# Patient Record
Sex: FEMALE | Race: WHITE | URBAN - METROPOLITAN AREA
[De-identification: names, ages, dates, MRNs, and addresses within clinical notes are randomized per-mention and may not be internally consistent; named-entity substitution may affect disease eponyms.]

---

## 2020-07-27 ENCOUNTER — VIRTUAL VISIT (OUTPATIENT)
Dept: FAMILY MEDICINE CLINIC | Age: 80
End: 2020-07-27

## 2020-07-27 DIAGNOSIS — E06.3 HYPOTHYROIDISM DUE TO HASHIMOTO'S THYROIDITIS: ICD-10-CM

## 2020-07-27 DIAGNOSIS — E03.8 HYPOTHYROIDISM DUE TO HASHIMOTO'S THYROIDITIS: ICD-10-CM

## 2020-07-27 DIAGNOSIS — M81.0 AGE-RELATED OSTEOPOROSIS WITHOUT CURRENT PATHOLOGICAL FRACTURE: ICD-10-CM

## 2020-07-27 DIAGNOSIS — K21.9 GASTROESOPHAGEAL REFLUX DISEASE WITHOUT ESOPHAGITIS: Primary | ICD-10-CM

## 2020-07-27 DIAGNOSIS — F41.8 DEPRESSION WITH ANXIETY: ICD-10-CM

## 2020-07-27 DIAGNOSIS — G20 PARKINSON'S DISEASE (HCC): ICD-10-CM

## 2020-07-27 DIAGNOSIS — E78.00 PURE HYPERCHOLESTEROLEMIA: ICD-10-CM

## 2020-07-27 PROBLEM — F13.20 BENZODIAZEPINE DEPENDENCE (HCC): Status: ACTIVE | Noted: 2020-07-27

## 2020-07-27 PROBLEM — E03.9 ACQUIRED HYPOTHYROIDISM: Status: RESOLVED | Noted: 2020-07-27 | Resolved: 2020-07-27

## 2020-07-27 PROBLEM — E03.9 ACQUIRED HYPOTHYROIDISM: Status: ACTIVE | Noted: 2020-07-27

## 2020-07-27 PROBLEM — K22.4 ESOPHAGEAL DYSMOTILITY: Status: ACTIVE | Noted: 2020-07-27

## 2020-07-27 RX ORDER — BUPROPION HYDROCHLORIDE 100 MG/1
TABLET, EXTENDED RELEASE ORAL
COMMUNITY
Start: 2020-05-12 | End: 2020-07-27 | Stop reason: ALTCHOICE

## 2020-07-27 RX ORDER — LOVASTATIN 40 MG/1
40 TABLET ORAL
Qty: 90 TAB | Refills: 2 | COMMUNITY
Start: 2020-07-27

## 2020-07-27 RX ORDER — ESCITALOPRAM OXALATE 20 MG/1
20 TABLET ORAL DAILY
COMMUNITY
Start: 2020-03-11 | End: 2020-08-28 | Stop reason: ALTCHOICE

## 2020-07-27 RX ORDER — LEVOTHYROXINE, LIOTHYRONINE 9.5; 2.25 UG/1; UG/1
TABLET ORAL
COMMUNITY
Start: 2020-05-14 | End: 2020-07-27 | Stop reason: SINTOL

## 2020-07-27 RX ORDER — LORAZEPAM 0.5 MG/1
0.5 TABLET ORAL
COMMUNITY
End: 2020-08-28

## 2020-07-27 RX ORDER — AMLODIPINE BESYLATE 5 MG/1
5 TABLET ORAL DAILY
COMMUNITY
Start: 2020-04-13 | End: 2020-12-28 | Stop reason: SDUPTHER

## 2020-07-27 RX ORDER — LISINOPRIL 40 MG/1
40 TABLET ORAL DAILY
COMMUNITY
Start: 2020-05-11

## 2020-07-27 RX ORDER — CARBIDOPA AND LEVODOPA 25; 100 MG/1; MG/1
0.5 TABLET ORAL 3 TIMES DAILY
COMMUNITY
Start: 2020-04-14

## 2020-07-27 RX ORDER — OMEPRAZOLE 40 MG/1
40 CAPSULE, DELAYED RELEASE ORAL DAILY
Qty: 14 CAP | Refills: 0 | Status: SHIPPED | OUTPATIENT
Start: 2020-07-27 | End: 2020-08-28 | Stop reason: SDUPTHER

## 2020-07-27 RX ORDER — LEVOTHYROXINE SODIUM 25 UG/1
1 CAPSULE ORAL
Qty: 90 CAP | Refills: 0 | Status: SHIPPED | OUTPATIENT
Start: 2020-07-27 | End: 2020-09-22 | Stop reason: ALTCHOICE

## 2020-07-27 NOTE — PROGRESS NOTES
Assessment/Plan:    Diagnoses and all orders for this visit:    1. Gastroesophageal reflux disease without esophagitis- 2 week trial   -     omeprazole (PRILOSEC) 40 mg capsule; Take 1 Cap by mouth daily. 2. Age-related osteoporosis without current pathological fracture- declines treatment. 3. Hypothyroidism due to Hashimoto's thyroiditis- failed NP thyroid and synthroid. Trial tirosint after 1-2 weeks of stopping NP thyroid to let side effects subside.  -     Tirosint 25 mcg cap; Take 1 Cap by mouth nightly.  -     TSH 3RD GENERATION; Future  -     T4, FREE; Future    4. Parkinson's disease ([facility] [address]- on sinement. 5. Depression with anxiety- manged by psych. Would like to see her wean ativan. 6. Pure hypercholesterolemia  -     LIPID PANEL W/ REFLX DIRECT LDL; Future    A total of 25 minutes was spent with the patient of which 25 minutes was spent in counseling regarding numerous health conditions, symptoms. Pascual Romero is a 78 y.o. female evaluated via telephone on 7/27/2020. Consent:  She and/or health care decision maker is aware that that she may receive a bill for this telephone service, depending on her insurance coverage, and has provided verbal consent to proceed: Yes      I affirm this is a Patient Initiated Episode with an Established Patient who has not had a related appointment within my department in the past 7 days or scheduled within the next 24 hours. Total Time: minutes: 21-30 minutes    Note: not billable if this call serves to triage the patient into an appointment for the relevant concern  This service was provided through (Telehealth, Virtual Check In or E-Visit), both the patient at home and the provider at [facility]  and the [facility] at [address] [place] [address] was discussed with the patient. The patient verbalized understanding and is in agreement with the plan. All medication potential side effects were discussed with the patient.   Health Maintenance:   Health Maintenance   Topic Date Due    Lipid Screen  12/21/1950    Pneumococcal 65+ years (2 of 2 - PPSV23) 05/28/2016    Medicare Yearly Exam  07/24/2020    DTaP/Tdap/Td series (1 - Tdap) 07/27/2021 (Originally 12/21/1961)    Shingrix Vaccine Age 50> (1 of 2) 08/11/2021 (Originally 12/21/1990)    Influenza Age 5 to Adult  08/01/2020    GLAUCOMA SCREENING Q2Y  07/01/2022    Bone Densitometry (Dexa) Screening  Completed       Wang Graf is a 78 y.o.  female and presents with No chief complaint on file. Subjective:  Pt is here to establish care. Hypothyroid - managed by dr. Trevon Lynn. Dx 1.5 yrs ago. Intol levothyroxine/synthroid due to palpitations. Has tried various formulations (NP thyroid) with a variety of side effects. She has lack of appetite with unintentional wt loss of 18lb x 1.5 yrs, since starting meds. PD - managed by Dr. Flower Avila, dx just 4mos ago. That's controlling her tremor. depr and anxiety with chronic benzo use - manged by psych. Osteoporosis-  Not on meds. Previously treated with bisphosphonate. Declines treatment. HTN - I reviewed labs in [facility]. GFR good. MRI recently looked pretty good. Minimal small vessel dz. She states she has \"stomach aches a lot\". She feels this is from the thyroid meds. She describes a burning sensation in her stomach. Doesn't feel eating is related to her stomach aches. ROS:  Constitutional: + recent weight change. No weakness/fatigue. No f/c. Skin: No rashes, change in nails/hair, itching   HENT: No HA, dizziness. No hearing loss/tinnitus. No nasal congestion/discharge. Eyes: No change in vision, double/blurred vision or eye pain/redness. Cardiovascular: No CP/palpitations. No HORTA/orthopnea/PND. Respiratory: No cough/sputum, dyspnea, wheezing. Gastointestinal: No dysphagia, +reflux. No n/v. No constipation/diarrhea. No melena/rectal bleeding.    Genitourinary: No dysuria, urinary hesitancy, nocturia, hematuria. No incontinence. Musculoskeletal: No joint pain/stiffness. No muscle pain/tenderness. Endo: No heat/cold intolerance, no polyuria/polydypsia. Heme: No h/o anemia. No easy bleeding/bruising. Allergy/Immunology: No seasonal rhinitis. Denies frequent colds, sinus/ear infections. Neurological: No seizures/numbness/weakness. No paresthesias. Psychiatric:  + depression, +anxiety. PMH:  Past Medical History:   Diagnosis Date    Anxiety disorder     Diverticular disease of colon     Hemorrhoids, internal     Hypertension     Mixed hyperlipidemia     MVP (mitral valve prolapse)     Vision impairment        PSH:  Past Surgical History:   Procedure Laterality Date    HX CATARACT REMOVAL  2008    HX HEMORRHOIDECTOMY  25 years ago        SH:  Social History     Tobacco Use    Smoking status: Former Smoker     Packs/day: 0.50     Years: 20.00     Pack years: 10.00    Smokeless tobacco: Never Used   Substance Use Topics    Alcohol use: Yes    Drug use: No       FH:  Family History   Problem Relation Age of Onset    Diabetes Father     Heart Failure Mother        Medications/Allergies:    Current Outpatient Medications:     amLODIPine (NORVASC) 5 mg tablet, Take 5 mg by mouth daily. , Disp: , Rfl:     carbidopa-levodopa (SINEMET)  mg per tablet, Take 0.5 Tabs by mouth three (3) times daily. , Disp: , Rfl:     escitalopram oxalate (LEXAPRO) 20 mg tablet, Take 20 mg by mouth daily. , Disp: , Rfl:     lisinopriL (PRINIVIL, ZESTRIL) 40 mg tablet, Take 40 mg by mouth daily. , Disp: , Rfl:     lovastatin (MEVACOR) 40 mg tablet, Take 1 Tab by mouth nightly., Disp: 90 Tab, Rfl: 2    LORazepam (ATIVAN) 0.5 mg tablet, Take 0.5 mg by mouth every six (6) hours as needed. , Disp: , Rfl:     NP Thyroid 15 mg tablet, , Disp: , Rfl:     aspirin 81 mg tablet, Take 81 mg by mouth.  , Disp: , Rfl:     Cholecalciferol, Vitamin D3, (VITAMIN D3) 1,000 unit Cap, Take  by mouth.  , Disp: , Rfl:    MULTIVITAMIN PO, Take  by mouth.  , Disp: , Rfl:   Allergies   Allergen Reactions    Hydrochlorothiazide Vertigo     Dizziness

## 2020-08-28 ENCOUNTER — VIRTUAL VISIT (OUTPATIENT)
Dept: FAMILY MEDICINE CLINIC | Age: 80
End: 2020-08-28

## 2020-08-28 DIAGNOSIS — K21.9 GASTROESOPHAGEAL REFLUX DISEASE WITHOUT ESOPHAGITIS: ICD-10-CM

## 2020-08-28 DIAGNOSIS — F13.20 BENZODIAZEPINE DEPENDENCE (HCC): ICD-10-CM

## 2020-08-28 DIAGNOSIS — E06.3 HYPOTHYROIDISM DUE TO HASHIMOTO'S THYROIDITIS: ICD-10-CM

## 2020-08-28 DIAGNOSIS — E03.8 HYPOTHYROIDISM DUE TO HASHIMOTO'S THYROIDITIS: ICD-10-CM

## 2020-08-28 DIAGNOSIS — F41.8 DEPRESSION WITH ANXIETY: Primary | ICD-10-CM

## 2020-08-28 DIAGNOSIS — Z00.00 MEDICARE ANNUAL WELLNESS VISIT, SUBSEQUENT: ICD-10-CM

## 2020-08-28 RX ORDER — OMEPRAZOLE 40 MG/1
40 CAPSULE, DELAYED RELEASE ORAL DAILY
Qty: 90 CAP | Refills: 3 | Status: SHIPPED | OUTPATIENT
Start: 2020-08-28

## 2020-08-28 RX ORDER — LORAZEPAM 0.5 MG/1
0.25 TABLET ORAL 2 TIMES DAILY
Qty: 60 TAB | Refills: 0
Start: 2020-08-28 | End: 2020-09-22

## 2020-08-28 RX ORDER — VENLAFAXINE HYDROCHLORIDE 37.5 MG/1
37.5 CAPSULE, EXTENDED RELEASE ORAL DAILY
Qty: 90 CAP | Refills: 0 | Status: SHIPPED | OUTPATIENT
Start: 2020-08-28 | End: 2020-09-22

## 2020-08-28 NOTE — PATIENT INSTRUCTIONS
Medicare Wellness Visit, Female The best way to live healthy is to have a lifestyle where you eat a well-balanced diet, exercise regularly, limit alcohol use, and quit all forms of tobacco/nicotine, if applicable. Regular preventive services are another way to keep healthy. Preventive services (vaccines, screening tests, monitoring & exams) can help personalize your care plan, which helps you manage your own care. Screening tests can find health problems at the earliest stages, when they are easiest to treat. [facility] follows the current, evidence-based guidelines published by the [facility] ([facility]) when recommending preventive services for our patients. Because we follow these guidelines, sometimes recommendations change over time as research supports it. (For example, mammograms used to be recommended annually. Even though Medicare will still pay for an annual mammogram, the newer guidelines recommend a mammogram every two years for women of average risk). Of course, you and your doctor may decide to screen more often for some diseases, based on your risk and your co-morbidities (chronic disease you are already diagnosed with). Preventive services for you include: - Medicare offers their members a free annual wellness visit, which is time for you and your primary care provider to discuss and plan for your preventive service needs. Take advantage of this benefit every year! 
-All adults over the age of 72 should receive the recommended pneumonia vaccines. Current USPSTF guidelines recommend a series of two vaccines for the best pneumonia protection.  
-All adults should have a flu vaccine yearly and a tetanus vaccine every 10 years.  
-All adults age 48 and older should receive the shingles vaccines (series of two vaccines). -All adults age 38-68 who are overweight should have a diabetes screening test once every three years. -All adults born between [birth year] and 1965 should be screened once for Hepatitis C. 
-Other screening tests and preventive services for persons with diabetes include: an eye exam to screen for diabetic retinopathy, a kidney function test, a foot exam, and stricter control over your cholesterol.  
-Cardiovascular screening for adults with routine risk involves an electrocardiogram (ECG) at intervals determined by your doctor.  
-Colorectal cancer screenings should be done for adults age 54-65 with no increased risk factors for colorectal cancer. There are a number of acceptable methods of screening for this type of cancer. Each test has its own benefits and drawbacks. Discuss with your doctor what is most appropriate for you during your annual wellness visit. The different tests include: colonoscopy (considered the best screening method), a fecal occult blood test, a fecal DNA test, and sigmoidoscopy. 
 
-A bone mass density test is recommended when a woman turns 65 to screen for osteoporosis. This test is only recommended one time, as a screening. Some providers will use this same test as a disease monitoring tool if you already have osteoporosis. -Breast cancer screenings are recommended every other year for women of normal risk, age 54-69. 
-Cervical cancer screenings for women over age 72 are only recommended with certain risk factors. Here is a list of your current Health Maintenance items (your personalized list of preventive services) with a due date: 
Health Maintenance Due Topic Date Due  Cholesterol Test   12/21/1950  Pneumococcal Vaccine (2 of 2 - PPSV23) 05/28/2016

## 2020-08-28 NOTE — PROGRESS NOTES
Adeal Dacosta is a 78 y.o. female who was seen by synchronous (real-time) audio technology on 8/28/2020 for Hypothyroidism and Anxiety    Assessment & Plan:   Diagnoses and all orders for this visit:    1. Depression with anxiety  -     venlafaxine-SR (EFFEXOR-XR) 37.5 mg capsule; Take 1 Cap by mouth daily.  -     LORazepam (ATIVAN) 0.5 mg tablet; Take 0.5 Tabs by mouth two (2) times a day. Max Daily Amount: 0.5 mg.    2. Benzodiazepine dependence ([facility] [address]- will wean patient and take over management of meds. -     LORazepam (ATIVAN) 0.5 mg tablet; Take 0.5 Tabs by mouth two (2) times a day. Max Daily Amount: 0.5 mg.    3. Hypothyroidism due to Hashimoto's thyroiditis    4. Gastroesophageal reflux disease without esophagitis  -     omeprazole (PRILOSEC) 40 mg capsule; Take 1 Cap by mouth daily. 5. Medicare annual wellness visit, subsequent      A total of 20 minutes was spent with the patient of which 20 minutes was spent in counseling regarding the above issues. Subjective:     Hypothyroid - failed np thyroid and synthroid. Tried on tirosint. She's tolerating is well so far. However, she still has lack of appetite and fatigue. She says she sleeps a lot during the daytime. She thinks it may be related to ativan use. Depression and anxiety - not well controlled. On lexapro. She worries all the time. She used to work prior to the pandemic and her life has drastically changed. She states she doesn't have anything to keep her busy. She endorses depression. Failed paxil. Was on zoloft many years ago, but not sure why she switched.    gerd - ppi helping a lot with her abd pain. Prior to Admission medications    Medication Sig Start Date End Date Taking? Authorizing Provider   amLODIPine (NORVASC) 5 mg tablet Take 5 mg by mouth daily. 4/13/20   Provider, Historical   carbidopa-levodopa (SINEMET)  mg per tablet Take 0.5 Tabs by mouth three (3) times daily.  4/14/20   Provider, Historical escitalopram oxalate (LEXAPRO) 20 mg tablet Take 20 mg by mouth daily. 3/11/20   Provider, Historical   LORazepam (ATIVAN) 0.5 mg tablet Take 0.5 mg by mouth every six (6) hours as needed. Provider, Historical   lisinopriL (PRINIVIL, ZESTRIL) 40 mg tablet Take 40 mg by mouth daily. 5/11/20   Provider, Historical   lovastatin (MEVACOR) 40 mg tablet Take 1 Tab by mouth nightly. 7/27/20   Unruly Mishra MD   Tirosint 25 mcg cap Take 1 Cap by mouth nightly. 7/27/20   Unruly Mishra MD   omeprazole (PRILOSEC) 40 mg capsule Take 1 Cap by mouth daily. 7/27/20   Unruly Mishra MD   aspirin 81 mg tablet Take 81 mg by mouth. Provider, Historical   Cholecalciferol, Vitamin D3, (VITAMIN D3) 1,000 unit Cap Take  by mouth. Provider, Historical   MULTIVITAMIN PO Take  by mouth. Provider, Historical     Patient Active Problem List   Diagnosis Code    Unspecified vitamin D deficiency E55.9    Benign hypertension I10    Depression with anxiety F41.8    Diverticulosis of colon without hemorrhage K57.30    Acid reflux K21.9    Esophageal dysmotility K22.4    Osteoporosis M81.0    Parkinsonism ([address] G20    Benzodiazepine dependence ([facility] [address] F13.20    Hypothyroidism due to Hashimoto's thyroiditis E03.8, E06.3       Review of Systems   Constitutional: Positive for malaise/fatigue. Gastrointestinal: Positive for heartburn. Neurological: Positive for tremors. Psychiatric/Behavioral: Positive for depression and memory loss. The patient is nervous/anxious.         Other pertinent observable physical exam findings:-    3 most recent PHQ Screens 8/28/2020   PHQ Not Done Active Diagnosis of Depression or Bipolar Disorder   Little interest or pleasure in doing things Nearly every day   Feeling down, depressed, irritable, or hopeless Nearly every day   Total Score PHQ 2 6   Trouble falling or staying asleep, or sleeping too much Not at all   Feeling tired or having little energy Nearly every day   Poor appetite, weight loss, or overeating Nearly every day   Feeling bad about yourself - or that you are a failure or have let yourself or your family down Not at all   Trouble concentrating on things such as school, work, reading, or watching TV Not at all   Moving or speaking so slowly that other people could have noticed; or the opposite being so fidgety that others notice Not at all   Thoughts of being better off dead, or hurting yourself in some way Not at all   PHQ 9 Score 12   How difficult have these problems made it for you to do your work, take care of your home and get along with others Very difficult         We discussed the expected course, resolution and complications of the diagnosis(es) in detail. Medication risks, benefits, costs, interactions, and alternatives were discussed as indicated. I advised her to contact the office if her condition worsens, changes or fails to improve as anticipated. She expressed understanding with the diagnosis(es) and plan. Oscar Webb, who was evaluated through a patient-initiated, synchronous (real-time) audio-video encounter, and/or her healthcare decision maker, is aware that it is a billable service, with coverage as determined by her insurance carrier. She provided verbal consent to proceed: Yes, and patient identification was verified. It was conducted pursuant to the emergency declaration under the [address] [place] [address] authority and the [employer] and [employer] Act. A caregiver was present when appropriate. Ability to conduct physical exam was limited. I was at home. The patient was at home. Siddhartha Duarte MD      This is the Subsequent Medicare Annual Wellness Exam, performed 12 months or more after the Initial AWV or the last Subsequent AWV    I have reviewed the patient's medical history in detail and updated the computerized patient record.      History     Patient Active Problem List   Diagnosis Code    Unspecified vitamin D deficiency E55.9    Benign hypertension I10    Depression with anxiety F41.8    Diverticulosis of colon without hemorrhage K57.30    Acid reflux K21.9    Esophageal dysmotility K22.4    Osteoporosis M81.0    Parkinsonism ([facility] [address] G20    Benzodiazepine dependence ([facility] [address] F13.20    Hypothyroidism due to Hashimoto's thyroiditis E03.8, E06.3     Past Medical History:   Diagnosis Date    Anxiety disorder     Diverticular disease of colon     Hemorrhoids, internal     Hypertension     Hypothyroid     Mixed hyperlipidemia     MVP (mitral valve prolapse)     Parkinson disease ([facility] [address]     Vision impairment       Past Surgical History:   Procedure Laterality Date    HX CATARACT REMOVAL  2008    HX HEMORRHOIDECTOMY  25 years ago     Current Outpatient Medications   Medication Sig Dispense Refill    amLODIPine (NORVASC) 5 mg tablet Take 5 mg by mouth daily.  carbidopa-levodopa (SINEMET)  mg per tablet Take 0.5 Tabs by mouth three (3) times daily.  escitalopram oxalate (LEXAPRO) 20 mg tablet Take 20 mg by mouth daily.  LORazepam (ATIVAN) 0.5 mg tablet Take 0.5 mg by mouth every six (6) hours as needed.  lisinopriL (PRINIVIL, ZESTRIL) 40 mg tablet Take 40 mg by mouth daily.  lovastatin (MEVACOR) 40 mg tablet Take 1 Tab by mouth nightly. 90 Tab 2    Tirosint 25 mcg cap Take 1 Cap by mouth nightly. 90 Cap 0    omeprazole (PRILOSEC) 40 mg capsule Take 1 Cap by mouth daily. 14 Cap 0    aspirin 81 mg tablet Take 81 mg by mouth.  Cholecalciferol, Vitamin D3, (VITAMIN D3) 1,000 unit Cap Take  by mouth.  MULTIVITAMIN PO Take  by mouth.          Allergies   Allergen Reactions    Hydrochlorothiazide Vertigo     Dizziness       Family History   Problem Relation Age of Onset    Diabetes Father     Heart Failure Mother      Social History     Tobacco Use    Smoking status: Former Smoker     Packs/day: 0.50     Years: 20.00 Pack years: 10.00    Smokeless tobacco: Never Used   Substance Use Topics    Alcohol use: Yes       Depression Risk Factor Screening:     3 most recent PHQ Screens 8/28/2020   PHQ Not Done Active Diagnosis of Depression or Bipolar Disorder   Little interest or pleasure in doing things Nearly every day   Feeling down, depressed, irritable, or hopeless Nearly every day   Total Score PHQ 2 6   Trouble falling or staying asleep, or sleeping too much Not at all   Feeling tired or having little energy Nearly every day   Poor appetite, weight loss, or overeating Nearly every day   Feeling bad about yourself - or that you are a failure or have let yourself or your family down Not at all   Trouble concentrating on things such as school, work, reading, or watching TV Not at all   Moving or speaking so slowly that other people could have noticed; or the opposite being so fidgety that others notice Not at all   Thoughts of being better off dead, or hurting yourself in some way Not at all   PHQ 9 Score 12   How difficult have these problems made it for you to do your work, take care of your home and get along with others Very difficult       Alcohol Risk Factor Screening:   Do you average 1 drink per night or more than 7 drinks a week:  No    On any one occasion in the past three months have you have had more than 3 drinks containing alcohol:  No      Functional Ability and Level of Safety:   Hearing: Hearing is good. Activities of Daily Living: The home contains: no safety equipment. Patient does total self care     Ambulation: with mild difficulty     Fall Risk:  Fall Risk Assessment, last 12 mths 8/28/2020   Able to walk? Yes   Fall in past 12 months? Yes   Fall with injury?  Yes   Number of falls in past 12 months 1   Fall Risk Score 2        Abuse Screen:  Patient is not abused       Cognitive Screening   Has your family/caregiver stated any concerns about your memory: no    Cognitive Screening: Normal - .    Patient Care Team   Patient Care Team:  Va Falcon MD as PCP - General (Internal Medicine)  Va Falcon MD as PCP - [facility]  Camelia Carrera DO (Neurology)  Osvaldo Lawrence MD (Psychiatry)  Yoselin Morales MD (Endocrinology)    Assessment/Plan   Education and counseling provided:  Are appropriate based on today's review and evaluation    Diagnoses and all orders for this visit:    1. Depression with anxiety  -     venlafaxine-SR (EFFEXOR-XR) 37.5 mg capsule; Take 1 Cap by mouth daily.  -     LORazepam (ATIVAN) 0.5 mg tablet; Take 0.5 Tabs by mouth two (2) times a day. Max Daily Amount: 0.5 mg.    2. Benzodiazepine dependence ([facility])  -     LORazepam (ATIVAN) 0.5 mg tablet; Take 0.5 Tabs by mouth two (2) times a day. Max Daily Amount: 0.5 mg.    3. Hypothyroidism due to Hashimoto's thyroiditis    4. Gastroesophageal reflux disease without esophagitis  -     omeprazole (PRILOSEC) 40 mg capsule; Take 1 Cap by mouth daily. 5. Medicare annual wellness visit, subsequent        Health Maintenance Due   Topic Date Due    Lipid Screen  12/21/1950    Pneumococcal 65+ years (2 of 2 - PPSV23) 05/28/2016       Samson Aj, who was evaluated through a synchronous (real-time) audio only encounter, and/or her healthcare decision maker, is aware that it is a billable service, with coverage as determined by her insurance carrier. She provided verbal consent to proceed: Yes, and patient identification was verified. It was conducted pursuant to the emergency declaration under the [facility], [address] [place] [address] authority and the [employer] and [employer] General Act. A caregiver was present when appropriate. Ability to conduct physical exam was limited. I was at home. The patient was at home.     Daniel Perkins MD

## 2020-09-14 ENCOUNTER — HOSPITAL ENCOUNTER (OUTPATIENT)
Dept: LAB | Age: 80
Discharge: HOME | End: 2020-09-14
Payer: MEDICARE

## 2020-09-14 DIAGNOSIS — E03.8 HYPOTHYROIDISM DUE TO HASHIMOTO'S THYROIDITIS: ICD-10-CM

## 2020-09-14 DIAGNOSIS — E06.3 HYPOTHYROIDISM DUE TO HASHIMOTO'S THYROIDITIS: ICD-10-CM

## 2020-09-14 DIAGNOSIS — E78.00 PURE HYPERCHOLESTEROLEMIA: ICD-10-CM

## 2020-09-14 LAB
CHOLEST SERPL-MCNC: 172 MG/DL
HDLC SERPL-MCNC: 82 MG/DL (ref 40–60)
HDLC SERPL: 2.1 {RATIO} (ref 0–5)
LDLC SERPL CALC-MCNC: 70 MG/DL (ref 0–100)
LIPID PROFILE,FLP: ABNORMAL
T4 FREE SERPL-MCNC: 1 NG/DL (ref 0.7–1.5)
TRIGL SERPL-MCNC: 100 MG/DL (ref ?–150)
TSH SERPL DL<=0.05 MIU/L-ACNC: 5.54 UIU/ML (ref 0.36–3.74)
VLDLC SERPL CALC-MCNC: 20 MG/DL

## 2020-09-14 PROCEDURE — 84443 ASSAY THYROID STIM HORMONE: CPT

## 2020-09-14 PROCEDURE — 84439 ASSAY OF FREE THYROXINE: CPT

## 2020-09-14 PROCEDURE — 80061 LIPID PANEL: CPT

## 2020-09-14 PROCEDURE — 36415 COLL VENOUS BLD VENIPUNCTURE: CPT

## 2020-09-22 ENCOUNTER — OFFICE VISIT (OUTPATIENT)
Dept: FAMILY MEDICINE CLINIC | Age: 80
End: 2020-09-22
Payer: MEDICARE

## 2020-09-22 VITALS
DIASTOLIC BLOOD PRESSURE: 60 MMHG | RESPIRATION RATE: 17 BRPM | BODY MASS INDEX: 20.96 KG/M2 | SYSTOLIC BLOOD PRESSURE: 94 MMHG | HEIGHT: 61 IN | TEMPERATURE: 97 F | OXYGEN SATURATION: 100 % | HEART RATE: 73 BPM | WEIGHT: 111 LBS

## 2020-09-22 DIAGNOSIS — Z23 ENCOUNTER FOR IMMUNIZATION: ICD-10-CM

## 2020-09-22 DIAGNOSIS — E06.3 HYPOTHYROIDISM DUE TO HASHIMOTO'S THYROIDITIS: ICD-10-CM

## 2020-09-22 DIAGNOSIS — E03.8 HYPOTHYROIDISM DUE TO HASHIMOTO'S THYROIDITIS: ICD-10-CM

## 2020-09-22 DIAGNOSIS — R63.4 UNINTENTIONAL WEIGHT LOSS: ICD-10-CM

## 2020-09-22 DIAGNOSIS — K59.03 DRUG-INDUCED CONSTIPATION: ICD-10-CM

## 2020-09-22 DIAGNOSIS — Z23 NEEDS FLU SHOT: ICD-10-CM

## 2020-09-22 DIAGNOSIS — F13.20 BENZODIAZEPINE DEPENDENCE (HCC): ICD-10-CM

## 2020-09-22 DIAGNOSIS — F41.8 DEPRESSION WITH ANXIETY: Primary | ICD-10-CM

## 2020-09-22 PROCEDURE — 90732 PPSV23 VACC 2 YRS+ SUBQ/IM: CPT

## 2020-09-22 PROCEDURE — 99214 OFFICE O/P EST MOD 30 MIN: CPT | Performed by: INTERNAL MEDICINE

## 2020-09-22 PROCEDURE — 90471 IMMUNIZATION ADMIN: CPT

## 2020-09-22 PROCEDURE — G8752 SYS BP LESS 140: HCPCS | Performed by: INTERNAL MEDICINE

## 2020-09-22 PROCEDURE — G9717 DOC PT DX DEP/BP F/U NT REQ: HCPCS | Performed by: INTERNAL MEDICINE

## 2020-09-22 PROCEDURE — G8536 NO DOC ELDER MAL SCRN: HCPCS | Performed by: INTERNAL MEDICINE

## 2020-09-22 PROCEDURE — G8754 DIAS BP LESS 90: HCPCS | Performed by: INTERNAL MEDICINE

## 2020-09-22 PROCEDURE — G0463 HOSPITAL OUTPT CLINIC VISIT: HCPCS | Performed by: INTERNAL MEDICINE

## 2020-09-22 PROCEDURE — G8420 CALC BMI NORM PARAMETERS: HCPCS | Performed by: INTERNAL MEDICINE

## 2020-09-22 PROCEDURE — 1101F PT FALLS ASSESS-DOCD LE1/YR: CPT | Performed by: INTERNAL MEDICINE

## 2020-09-22 PROCEDURE — 1090F PRES/ABSN URINE INCON ASSESS: CPT | Performed by: INTERNAL MEDICINE

## 2020-09-22 PROCEDURE — G8427 DOCREV CUR MEDS BY ELIG CLIN: HCPCS | Performed by: INTERNAL MEDICINE

## 2020-09-22 RX ORDER — NITROFURANTOIN 25; 75 MG/1; MG/1
100 CAPSULE ORAL EVERY 12 HOURS
COMMUNITY
Start: 2020-09-17 | End: 2020-09-24

## 2020-09-22 RX ORDER — CLOTRIMAZOLE AND BETAMETHASONE DIPROPIONATE 10; .64 MG/G; MG/G
1 CREAM TOPICAL 2 TIMES DAILY
COMMUNITY
Start: 2020-09-17 | End: 2020-10-13 | Stop reason: ALTCHOICE

## 2020-09-22 RX ORDER — BUSPIRONE HYDROCHLORIDE 15 MG/1
15 TABLET ORAL DAILY
Qty: 30 TAB | Refills: 0 | Status: SHIPPED | OUTPATIENT
Start: 2020-09-22 | End: 2020-10-21 | Stop reason: SDUPTHER

## 2020-09-22 RX ORDER — LORAZEPAM 0.5 MG/1
0.25 TABLET ORAL
Qty: 30 TAB | Refills: 0 | Status: SHIPPED | OUTPATIENT
Start: 2020-09-22 | End: 2020-10-21 | Stop reason: SDUPTHER

## 2020-09-22 RX ORDER — POLYETHYLENE GLYCOL 3350 17 G/17G
17 POWDER, FOR SOLUTION ORAL DAILY
Qty: 1700 G | Refills: 3 | Status: SHIPPED | OUTPATIENT
Start: 2020-09-22

## 2020-09-22 RX ORDER — VENLAFAXINE HYDROCHLORIDE 75 MG/1
75 CAPSULE, EXTENDED RELEASE ORAL DAILY
Qty: 90 CAP | Refills: 0 | Status: SHIPPED | OUTPATIENT
Start: 2020-09-22 | End: 2020-11-02

## 2020-09-22 NOTE — PROGRESS NOTES
Assessment/Plan:    1. Depression with anxiety  -incr dose effexor. Add buspar. Wean ativan to daily from bid if possible. - LORazepam (ATIVAN) 0.5 mg tablet; Take 0.5 Tabs by mouth daily as needed for Anxiety. Dispense: 30 Tab; Refill: 0  - venlafaxine-SR (EFFEXOR-XR) 75 mg capsule; Take 1 Cap by mouth daily. Dispense: 90 Cap; Refill: 0  - busPIRone (BUSPAR) 15 mg tablet; Take 1 Tab by mouth daily. Dispense: 30 Tab; Refill: 0    2. Benzodiazepine dependence ([address]  -wean as above  - LORazepam (ATIVAN) 0.5 mg tablet; Take 0.5 Tabs by mouth daily as needed for Anxiety. Dispense: 30 Tab; Refill: 0    3. Hypothyroidism due to Hashimoto's thyroiditis  -trial off meds as it appears she had subclinical hypothyroid and pt attributes sx to meds. - TSH AND FREE T4; Future  - T3, FREE; Future    4. Drug-induced constipation  -likely from sinemet.  miralax daily. - polyethylene glycol (MIRALAX) 17 gram/dose powder; Take 17 g by mouth daily. Dispense: 1700 g; Refill: 3    5. Unintentional wt loss and early satiety - if not improved off thyroid meds, need to do colo, mammo to r/o occult malignancy. The plan was discussed with the patient. The patient verbalized understanding and is in agreement with the plan. All medication potential side effects were discussed with the patient. Health Maintenance:   Health Maintenance   Topic Date Due    Pneumococcal 65+ years (2 of 2 - PPSV23) 05/28/2016    Flu Vaccine (1) 09/01/2020    DTaP/Tdap/Td series (1 - Tdap) 07/27/2021 (Originally 12/21/1961)    Shingrix Vaccine Age 50> (1 of 2) 08/11/2021 (Originally 12/21/1990)    Medicare Yearly Exam  08/29/2021    Lipid Screen  09/14/2021    GLAUCOMA SCREENING Q2Y  07/01/2022    Bone Densitometry (Dexa) Screening  Completed       Sumanth Morales is a 78 y.o. female and presents with Hypertension     Subjective:  depr and anxiety with benzo dependence - we switched pt to effexor last month.   Still struggles with anxiety and waves of sadness. We have been weaning her ativan as well b/c she thought it was contributing to her fatigue. She states she's bored b/c of not being able to do things during pandemic. She can't work like she used to, exercise at gym. Failed zoloft, paxil, wellbutrin in past.     Pt also started on tirosint due to intol of other formulations. tsh is high. She states she started thyroid meds 2 years ago and she has felt poorly since then. It's caused lack of appetite, wt loss. When I look back at labs in [address] 1/2019, labs show subclinical hypothyroid. Has constipation. Started miralax, which helped but she is worried about taking it daily. ROS:  Constitutional: + recent weight change. +fatigue. No f/c. Cardiovascular: No CP/palpitations. No HORTA/orthopnea/PND. Respiratory: No cough/sputum, dyspnea, wheezing. Gastointestinal: No dysphagia, reflux. No n/v. No constipation/diarrhea. No melena/rectal bleeding. Genitourinary: No dysuria, urinary hesitancy, nocturia, hematuria. No incontinence. Neurological: No seizures/numbness/weakness. No paresthesias. Psychiatric:  No depression, anxiety. The problem list was updated as a part of today's visit. Patient Active Problem List   Diagnosis Code    Unspecified vitamin D deficiency E55.9    Benign hypertension I10    Depression with anxiety F41.8    Diverticulosis of colon without hemorrhage K57.30    Acid reflux K21.9    Esophageal dysmotility K22.4    Osteoporosis M81.0    Parkinsonism ([address] G20    Benzodiazepine dependence ([address] F13.20    Hypothyroidism due to Hashimoto's thyroiditis E03.8, E06.3       The PSH, FH were reviewed. SH:  Social History     Tobacco Use    Smoking status: Former Smoker     Packs/day: 0.50     Years: 20.00     Pack years: 10.00    Smokeless tobacco: Never Used   Substance Use Topics    Alcohol use:  Yes    Drug use: No       Medications/Allergies:  Current Outpatient Medications on File Prior to Visit   Medication Sig Dispense Refill    clotrimazole-betamethasone (LOTRISONE) topical cream Apply 1 Dose to affected area two (2) times a day.  nitrofurantoin, macrocrystal-monohydrate, (MACROBID) 100 mg capsule Take 100 mg by mouth every twelve (12) hours.  venlafaxine-SR (EFFEXOR-XR) 37.5 mg capsule Take 1 Cap by mouth daily. 90 Cap 0    LORazepam (ATIVAN) 0.5 mg tablet Take 0.5 Tabs by mouth two (2) times a day. Max Daily Amount: 0.5 mg. 60 Tab 0    amLODIPine (NORVASC) 5 mg tablet Take 5 mg by mouth daily.  carbidopa-levodopa (SINEMET)  mg per tablet Take 0.5 Tabs by mouth three (3) times daily.  lisinopriL (PRINIVIL, ZESTRIL) 40 mg tablet Take 40 mg by mouth daily.  lovastatin (MEVACOR) 40 mg tablet Take 1 Tab by mouth nightly. 90 Tab 2    Tirosint 25 mcg cap Take 1 Cap by mouth nightly. 90 Cap 0    Cholecalciferol, Vitamin D3, (VITAMIN D3) 1,000 unit Cap Take  by mouth.  omeprazole (PRILOSEC) 40 mg capsule Take 1 Cap by mouth daily. 90 Cap 3    [DISCONTINUED] aspirin 81 mg tablet Take 81 mg by mouth.  [DISCONTINUED] MULTIVITAMIN PO Take  by mouth. No current facility-administered medications on file prior to visit. Allergies   Allergen Reactions    Hydrochlorothiazide Vertigo     Dizziness       Objective:  Visit Vitals  BP 94/60 (BP 1 Location: Left arm) Comment (BP 1 Location): manual   Pulse 73   Temp 97 °F (36.1 °C) (Oral)   Resp 17   Ht 5' 1\" (1.549 m)   Wt 111 lb (50.3 kg)   SpO2 100%   BMI 20.97 kg/m²      Constitutional: Well developed, nourished, no distress, alert, thin   Eyes: Conjunctiva normal. PERRL. Eyelids normal.   CV: S1, S2.  RRR. No murmurs/rubs. No edema. Pulm: No abnormalities on inspection. Clear to auscultation bilaterally. No wheezing/rhonchi. Normal effort. GI: Soft, nontender, nondistended. Normal active bowel sounds. Psych: Appropriate affect, judgement and insight.   Short-term memory intact.

## 2020-09-22 NOTE — PROGRESS NOTES
Urgent care 2020  Pierre Gibson is a 78 y.o. female (: 1940) presenting to address:    Chief Complaint   Patient presents with    Hypertension       Vitals:    20 1303 20 1312   BP: (!) 92/50 94/60   Pulse: 73    Resp: 17    Temp: 97 °F (36.1 °C)    TempSrc: Oral    SpO2: 100%    Weight: 111 lb (50.3 kg)    Height: 5' 1\" (1.549 m)    PainSc:   0 - No pain        Hearing/Vision:   No exam data present    Learning Assessment:   No flowsheet data found. Depression Screening:     3 most recent PHQ Screens 2020   PHQ Not Done Active Diagnosis of Depression or Bipolar Disorder   Little interest or pleasure in doing things Nearly every day   Feeling down, depressed, irritable, or hopeless Nearly every day   Total Score PHQ 2 6   Trouble falling or staying asleep, or sleeping too much Not at all   Feeling tired or having little energy Nearly every day   Poor appetite, weight loss, or overeating Nearly every day   Feeling bad about yourself - or that you are a failure or have let yourself or your family down Not at all   Trouble concentrating on things such as school, work, reading, or watching TV Not at all   Moving or speaking so slowly that other people could have noticed; or the opposite being so fidgety that others notice Not at all   Thoughts of being better off dead, or hurting yourself in some way Not at all   PHQ 9 Score 12   How difficult have these problems made it for you to do your work, take care of your home and get along with others Very difficult     Fall Risk Assessment:     Fall Risk Assessment, last 12 mths 2020   Able to walk? Yes   Fall in past 12 months? No   Fall with injury? -   Number of falls in past 12 months -   Fall Risk Score -     Abuse Screening:     Abuse Screening Questionnaire 2020   Do you ever feel afraid of your partner? N   Are you in a relationship with someone who physically or mentally threatens you? N   Is it safe for you to go home?  Josselin Hopper Coordination of Care Questionaire:   1. Have you been to the ER, urgent care clinic since your last visit? Hospitalized since your last visit  Yes, urgent care walk in clinic near [facility]     2. Have you seen or consulted any other health care providers outside of the [address] [place] [address] since your last visit? Include any pap smears or colon screening. No   Advanced Directive:   1. Do you have an Advanced Directive? Yes   2. Would you like information on Advanced Directives?  no      Health Maintenance Due   Topic Date Due    Lipid Screen  12/21/1950    Pneumococcal 65+ years (2 of 2 - PPSV23) 05/28/2016    Flu Vaccine (1) 09/01/2020     Wants flu   Immunization/s administered 9/22/2020 by Brigida Bey LPN with guardian's consent. Patient tolerated procedure well. No reactions noted.     fluad left , pneumo 23 rt deltoid

## 2020-09-28 ENCOUNTER — TELEPHONE (OUTPATIENT)
Dept: FAMILY MEDICINE CLINIC | Age: 80
End: 2020-09-28

## 2020-09-28 NOTE — TELEPHONE ENCOUNTER
Patient is still experiencing UTI symptoms and would like to know what else she can do.  Requesting to speak to the nurse

## 2020-09-28 NOTE — TELEPHONE ENCOUNTER
Returned pt call . she was on antibiotics from urgent care. She is experiencing burning. Scheduled for vv, will come by afterwards to bring urine sample.

## 2020-09-29 ENCOUNTER — VIRTUAL VISIT (OUTPATIENT)
Dept: FAMILY MEDICINE CLINIC | Age: 80
End: 2020-09-29
Payer: MEDICARE

## 2020-09-29 ENCOUNTER — APPOINTMENT (OUTPATIENT)
Dept: FAMILY MEDICINE CLINIC | Age: 80
End: 2020-09-29

## 2020-09-29 DIAGNOSIS — R39.9 UTI SYMPTOMS: Primary | ICD-10-CM

## 2020-09-29 PROCEDURE — 99441 PR PHYS/QHP TELEPHONE EVALUATION 5-10 MIN: CPT | Performed by: INTERNAL MEDICINE

## 2020-09-29 RX ORDER — AMOXICILLIN AND CLAVULANATE POTASSIUM 500; 125 MG/1; MG/1
1 TABLET, FILM COATED ORAL 2 TIMES DAILY
Qty: 14 TAB | Refills: 0 | Status: SHIPPED | OUTPATIENT
Start: 2020-09-29 | End: 2020-10-06

## 2020-09-29 NOTE — PROGRESS NOTES
Telma Allan is a 78 y.o. female evaluated via telephone on 9/29/2020. Consent:  She and/or health care decision maker is aware that that she may receive a bill for this telephone service, depending on her insurance coverage, and has provided verbal consent to proceed: Yes      Documentation:  I communicated with the patient and/or health care decision maker about ongoing UTI sx. No pelvic pain or flank. No f/c. Was treated by urgent care for UTI with macrobid (finished a week ago). States usually she gets cipro. Sx resolved for 1-2 days then returned. Pt c/o urinary frequency, dysuria. She feels \"something in that area\". No vaginal itching or discharge. Diagnoses and all orders for this visit:    1. UTI symptoms-failed macrobid. Get culture. Treat with augmentin as cipro is contraindicated due to effexor.  -     AMB POC URINALYSIS DIP STICK AUTO W/O MICRO  -     CULTURE, URINE; Future  -     amoxicillin-clavulanate (AUGMENTIN) 500-125 mg per tablet; Take 1 Tab by mouth two (2) times a day for 7 days. I affirm this is a Patient Initiated Episode with an Established Patient who has not had a related appointment within my department in the past 7 days or scheduled within the next 24 hours.     Total Time: minutes: 5-10 minutes    Note: not billable if this call serves to triage the patient into an appointment for the relevant concern  This service was provided through (Telehealth, Virtual Check In or E-Visit), both the patient at home and the provider at [facility]  and the DEJAH Almonte at [email or web address]  MD Patricio

## 2020-10-01 LAB
BACTERIA UR CULT: NORMAL
T3FREE SERPL-MCNC: 2.4 PG/ML (ref 2–4.4)
T4 FREE SERPL-MCNC: 1.09 NG/DL (ref 0.82–1.77)
TSH SERPL DL<=0.005 MIU/L-ACNC: 10.4 UIU/ML (ref 0.45–4.5)

## 2020-10-06 ENCOUNTER — OFFICE VISIT (OUTPATIENT)
Dept: FAMILY MEDICINE CLINIC | Age: 80
End: 2020-10-06
Payer: MEDICARE

## 2020-10-06 VITALS
RESPIRATION RATE: 16 BRPM | OXYGEN SATURATION: 98 % | HEART RATE: 78 BPM | DIASTOLIC BLOOD PRESSURE: 76 MMHG | SYSTOLIC BLOOD PRESSURE: 121 MMHG | TEMPERATURE: 98.1 F

## 2020-10-06 DIAGNOSIS — N39.0 URINARY TRACT INFECTION WITHOUT HEMATURIA, SITE UNSPECIFIED: Primary | ICD-10-CM

## 2020-10-06 PROCEDURE — 99213 OFFICE O/P EST LOW 20 MIN: CPT | Performed by: NURSE PRACTITIONER

## 2020-10-06 PROCEDURE — G0463 HOSPITAL OUTPT CLINIC VISIT: HCPCS | Performed by: NURSE PRACTITIONER

## 2020-10-06 RX ORDER — PHENAZOPYRIDINE HYDROCHLORIDE 200 MG/1
200 TABLET, FILM COATED ORAL
Qty: 15 TAB | Refills: 0 | Status: SHIPPED | OUTPATIENT
Start: 2020-10-06 | End: 2020-10-11

## 2020-10-06 RX ORDER — ESTRADIOL 0.1 MG/G
CREAM VAGINAL
Qty: 42.5 G | Refills: 5 | Status: SHIPPED | OUTPATIENT
Start: 2020-10-06

## 2020-10-06 NOTE — PROGRESS NOTES
BRETT Martin is a 78y.o. year old female who presents today with persistent UTI symptoms. She was seen at an urgent care about 3 weeks ago and prescribed macrobid. Her symptoms persisted after antibiotic completion so she had a VV with her PCP one week ago and was prescribed augmentin. She also dropped off a urine sample which grew mixed genital nevin. This past Saturday she continued to have symptoms despite the change in antibiotics so she went to the ER. Another UA was done which showed a probable UTI and her antibiotics were changed to doxycycline. She was also given percocet for her dysuria. Today she is still experiencing dysuria, frequency, some urgency and lower abdominal discomfort. No leukocytosis noted from labs done at the ED. Abdominal CT unremarkable. Urine culture shows no growth to date. Past Medical History:   Diagnosis Date    Anxiety disorder     Diverticular disease of colon     Hemorrhoids, internal     Hypertension     Hypothyroid     Mixed hyperlipidemia     MVP (mitral valve prolapse)     Parkinson disease ([address]     Vision impairment      Past Surgical History:   Procedure Laterality Date    HX CATARACT REMOVAL  2008    HX HEMORRHOIDECTOMY  25 years ago     Social History     Tobacco Use    Smoking status: Former Smoker     Packs/day: 0.50     Years: 20.00     Pack years: 10.00    Smokeless tobacco: Never Used   Substance Use Topics    Alcohol use: Yes    Drug use: No         Current Outpatient Medications:     LORazepam (ATIVAN) 0.5 mg tablet, Take 0.5 Tabs by mouth daily as needed for Anxiety. , Disp: 30 Tab, Rfl: 0    venlafaxine-SR (EFFEXOR-XR) 75 mg capsule, Take 1 Cap by mouth daily. , Disp: 90 Cap, Rfl: 0    busPIRone (BUSPAR) 15 mg tablet, Take 1 Tab by mouth daily. , Disp: 30 Tab, Rfl: 0    polyethylene glycol (MIRALAX) 17 gram/dose powder, Take 17 g by mouth daily. , Disp: 1700 g, Rfl: 3    amLODIPine (NORVASC) 5 mg tablet, Take 5 mg by mouth daily., Disp: , Rfl:     carbidopa-levodopa (SINEMET)  mg per tablet, Take 0.5 Tabs by mouth three (3) times daily. , Disp: , Rfl:     lisinopriL (PRINIVIL, ZESTRIL) 40 mg tablet, Take 40 mg by mouth daily. , Disp: , Rfl:     lovastatin (MEVACOR) 40 mg tablet, Take 1 Tab by mouth nightly., Disp: 90 Tab, Rfl: 2    Cholecalciferol, Vitamin D3, (VITAMIN D3) 1,000 unit Cap, Take  by mouth.  , Disp: , Rfl:     amoxicillin-clavulanate (AUGMENTIN) 500-125 mg per tablet, Take 1 Tab by mouth two (2) times a day for 7 days. , Disp: 14 Tab, Rfl: 0    clotrimazole-betamethasone (LOTRISONE) topical cream, Apply 1 Dose to affected area two (2) times a day., Disp: , Rfl:     omeprazole (PRILOSEC) 40 mg capsule, Take 1 Cap by mouth daily. , Disp: 90 Cap, Rfl: 3     Allergies   Allergen Reactions    Hydrochlorothiazide Vertigo     Dizziness     Review of Systems   Constitutional: Negative for chills and fever. Respiratory: Negative for cough and shortness of breath. Cardiovascular: Negative for chest pain and palpitations. Gastrointestinal: Negative for diarrhea, nausea and vomiting. Genitourinary: Positive for dysuria, frequency and urgency. Negative for flank pain and hematuria. PE  /76 (BP 1 Location: Left arm, BP Patient Position: Sitting)   Pulse 78   Temp 98.1 °F (36.7 °C) (Oral)   Resp 16   SpO2 98%     Physical Exam  Constitutional:       General: She is not in acute distress. Appearance: Normal appearance. HENT:      Head: Normocephalic and atraumatic. Cardiovascular:      Rate and Rhythm: Normal rate and regular rhythm. Pulses: Normal pulses. Heart sounds: No murmur. No friction rub. No gallop. Pulmonary:      Effort: Pulmonary effort is normal. No respiratory distress. Breath sounds: No wheezing or rhonchi. Abdominal:      General: Abdomen is flat. Bowel sounds are normal. There is no distension. Palpations: Abdomen is soft. There is no mass. Tenderness: There is abdominal tenderness. There is no guarding or rebound. Comments: Mild lower abdominal tenderness with deep palpation   Genitourinary:     Comments: deferred  Skin:     General: Skin is warm and dry. Capillary Refill: Capillary refill takes less than 2 seconds. Neurological:      General: No focal deficit present. Mental Status: She is alert and oriented to person, place, and time. Mental status is at baseline. Assessment/Plan  1.  UTI  Continue doxycycline as rxed  Pyridium 200mg PO BID X5 days, counseled on importance of taking with meals  Premarin cream every day X1 week then three times a week  Refer to urology  FU if s/s worsen or do not improve

## 2020-10-13 ENCOUNTER — APPOINTMENT (OUTPATIENT)
Dept: FAMILY MEDICINE CLINIC | Age: 80
End: 2020-10-13

## 2020-10-13 ENCOUNTER — HOSPITAL ENCOUNTER (OUTPATIENT)
Dept: LAB | Age: 80
Discharge: HOME | End: 2020-10-13
Payer: MEDICARE

## 2020-10-13 ENCOUNTER — OFFICE VISIT (OUTPATIENT)
Dept: FAMILY MEDICINE CLINIC | Age: 80
End: 2020-10-13
Payer: MEDICARE

## 2020-10-13 VITALS
HEART RATE: 75 BPM | BODY MASS INDEX: 20.54 KG/M2 | TEMPERATURE: 97.3 F | DIASTOLIC BLOOD PRESSURE: 63 MMHG | SYSTOLIC BLOOD PRESSURE: 109 MMHG | WEIGHT: 108.8 LBS | OXYGEN SATURATION: 99 % | HEIGHT: 61 IN | RESPIRATION RATE: 17 BRPM

## 2020-10-13 DIAGNOSIS — R68.2 DRY MOUTH: Primary | ICD-10-CM

## 2020-10-13 DIAGNOSIS — R68.2 DRY MOUTH: ICD-10-CM

## 2020-10-13 LAB
ALBUMIN SERPL-MCNC: 4 G/DL (ref 3.4–5)
ALBUMIN/GLOB SERPL: 1.4 {RATIO} (ref 0.8–1.7)
ALP SERPL-CCNC: 60 U/L (ref 45–117)
ALT SERPL-CCNC: 26 U/L (ref 13–56)
ANION GAP SERPL CALC-SCNC: 5 MMOL/L (ref 3–18)
AST SERPL-CCNC: 26 U/L (ref 10–38)
BILIRUB SERPL-MCNC: 0.7 MG/DL (ref 0.2–1)
BUN SERPL-MCNC: 15 MG/DL (ref 7–18)
BUN/CREAT SERPL: 17 (ref 12–20)
CALCIUM SERPL-MCNC: 9.3 MG/DL (ref 8.5–10.1)
CHLORIDE SERPL-SCNC: 104 MMOL/L (ref 100–111)
CO2 SERPL-SCNC: 31 MMOL/L (ref 21–32)
CREAT SERPL-MCNC: 0.89 MG/DL (ref 0.6–1.3)
GLOBULIN SER CALC-MCNC: 2.8 G/DL (ref 2–4)
GLUCOSE SERPL-MCNC: 97 MG/DL (ref 74–99)
POTASSIUM SERPL-SCNC: 4.5 MMOL/L (ref 3.5–5.5)
PROT SERPL-MCNC: 6.8 G/DL (ref 6.4–8.2)
SODIUM SERPL-SCNC: 140 MMOL/L (ref 136–145)

## 2020-10-13 PROCEDURE — 80053 COMPREHEN METABOLIC PANEL: CPT

## 2020-10-13 PROCEDURE — 99213 OFFICE O/P EST LOW 20 MIN: CPT | Performed by: INTERNAL MEDICINE

## 2020-10-13 PROCEDURE — G8754 DIAS BP LESS 90: HCPCS | Performed by: INTERNAL MEDICINE

## 2020-10-13 PROCEDURE — 86235 NUCLEAR ANTIGEN ANTIBODY: CPT

## 2020-10-13 PROCEDURE — G8420 CALC BMI NORM PARAMETERS: HCPCS | Performed by: INTERNAL MEDICINE

## 2020-10-13 PROCEDURE — G0463 HOSPITAL OUTPT CLINIC VISIT: HCPCS | Performed by: INTERNAL MEDICINE

## 2020-10-13 PROCEDURE — 1101F PT FALLS ASSESS-DOCD LE1/YR: CPT | Performed by: INTERNAL MEDICINE

## 2020-10-13 PROCEDURE — G8752 SYS BP LESS 140: HCPCS | Performed by: INTERNAL MEDICINE

## 2020-10-13 PROCEDURE — G9717 DOC PT DX DEP/BP F/U NT REQ: HCPCS | Performed by: INTERNAL MEDICINE

## 2020-10-13 PROCEDURE — G8536 NO DOC ELDER MAL SCRN: HCPCS | Performed by: INTERNAL MEDICINE

## 2020-10-13 PROCEDURE — 1090F PRES/ABSN URINE INCON ASSESS: CPT | Performed by: INTERNAL MEDICINE

## 2020-10-13 PROCEDURE — 36415 COLL VENOUS BLD VENIPUNCTURE: CPT

## 2020-10-13 PROCEDURE — G8427 DOCREV CUR MEDS BY ELIG CLIN: HCPCS | Performed by: INTERNAL MEDICINE

## 2020-10-13 RX ORDER — FLUORIDE TOOTHPASTE
15 TOOTHPASTE DENTAL AS NEEDED
Qty: 1000 ML | Refills: 0 | COMMUNITY
Start: 2020-10-13

## 2020-10-13 NOTE — PROGRESS NOTES
Assessment/Plan:    1. Dry mouth  -trial biotene. If not effective, try pilocarpine. F/u in 1mo  - SJOGREN'S ABS, SSA AND SSB; Future  - saliva substitution (Biotene Dry Mouth Oral Rinse) mwsh mouthwash; 15 mL by Mucous Membrane route as needed (dry mouth). Dispense: 1000 mL; Refill: 0  - METABOLIC PANEL, COMPREHENSIVE; Future      The plan was discussed with the patient. The patient verbalized understanding and is in agreement with the plan. All medication potential side effects were discussed with the patient. Health Maintenance:   Health Maintenance   Topic Date Due    DTaP/Tdap/Td series (1 - Tdap) 07/27/2021 (Originally 12/21/1961)    Shingrix Vaccine Age 50> (1 of 2) 08/11/2021 (Originally 12/21/1990)    Medicare Yearly Exam  08/29/2021    Lipid Screen  09/14/2021    GLAUCOMA SCREENING Q2Y  07/01/2022    Bone Densitometry (Dexa) Screening  Completed    Flu Vaccine  Completed    Pneumococcal 65+ years  Completed       Katarzyna Vines is a 78 y.o. female and presents with Dysphagia     Subjective:  Pt c/o several week h/o difficulty swallowing her saliva. Has a dry mouth. Can't eat toast or crackers due to dry mouth. No odynophagia. No difficulty eating and drinking. Getting worse. Has unintentional wt loss- has lost about 20lb in past 6mos. We stopped thyroid supplementation last month b/c pt attributed all her sx to med. She remains in subclinical range off of supplementation. Has h/o esophageal dysmotility. CT abd/pelvis normal earlier this year. Has dry eyes. Has recurrent UTI - started on topical estrogen a week ago. Has pain in her \"vagina\". Has appt with urology today. Has pelvic pressure. vagisil helps. ROS:  Constitutional: No recent weight change. No weakness/fatigue. No f/c. HENT: No HA, dizziness. No hearing loss/tinnitus. No nasal congestion/discharge. Eyes: No change in vision, double/blurred vision or eye pain/redness.     Cardiovascular: No CP/palpitations. No HORTA/orthopnea/PND. Respiratory: No cough/sputum, dyspnea, wheezing. Gastointestinal: No dysphagia, reflux. No n/v.  + constipation/no diarrhea. No melena/rectal bleeding. Genitourinary: No dysuria, urinary hesitancy, nocturia, hematuria. No incontinence. Neurological: No seizures/numbness/weakness. No paresthesias. Psychiatric:  No depression, +anxiety. The problem list was updated as a part of today's visit. Patient Active Problem List   Diagnosis Code    Unspecified vitamin D deficiency E55.9    Benign hypertension I10    Depression with anxiety F41.8    Diverticulosis of colon without hemorrhage K57.30    Acid reflux K21.9    Esophageal dysmotility K22.4    Osteoporosis M81.0    Parkinsonism ([address] G20    Benzodiazepine dependence ([address] F13.20    Hypothyroidism due to Hashimoto's thyroiditis E03.8, E06.3    Unintentional weight loss R63.4    Drug-induced constipation K59.03       The PSH, FH were reviewed. SH:  Social History     Tobacco Use    Smoking status: Former Smoker     Packs/day: 0.50     Years: 20.00     Pack years: 10.00    Smokeless tobacco: Never Used   Substance Use Topics    Alcohol use: Yes    Drug use: No       Medications/Allergies:  Current Outpatient Medications on File Prior to Visit   Medication Sig Dispense Refill    estradioL (ESTRACE) 0.01 % (0.1 mg/gram) vaginal cream Apply to vaginal tissue (externally) once a day for one week and then three times a week thereafter 42.5 g 5    LORazepam (ATIVAN) 0.5 mg tablet Take 0.5 Tabs by mouth daily as needed for Anxiety. 30 Tab 0    busPIRone (BUSPAR) 15 mg tablet Take 1 Tab by mouth daily. 30 Tab 0    polyethylene glycol (MIRALAX) 17 gram/dose powder Take 17 g by mouth daily. 1700 g 3    amLODIPine (NORVASC) 5 mg tablet Take 5 mg by mouth daily.  carbidopa-levodopa (SINEMET)  mg per tablet Take 0.5 Tabs by mouth three (3) times daily.       lisinopriL (PRINIVIL, ZESTRIL) 40 mg tablet Take 40 mg by mouth daily.  lovastatin (MEVACOR) 40 mg tablet Take 1 Tab by mouth nightly. 90 Tab 2    Cholecalciferol, Vitamin D3, (VITAMIN D3) 1,000 unit Cap Take  by mouth.  clotrimazole-betamethasone (LOTRISONE) topical cream Apply 1 Dose to affected area two (2) times a day.  venlafaxine-SR (EFFEXOR-XR) 75 mg capsule Take 1 Cap by mouth daily. 90 Cap 0    omeprazole (PRILOSEC) 40 mg capsule Take 1 Cap by mouth daily. 90 Cap 3     No current facility-administered medications on file prior to visit. Allergies   Allergen Reactions    Hydrochlorothiazide Vertigo     Dizziness       Objective:  Visit Vitals  /63 (BP 1 Location: Left arm, BP Patient Position: Sitting)   Pulse 75   Temp 97.3 °F (36.3 °C) (Temporal)   Resp 17   Ht 5' 1\" (1.549 m)   Wt 108 lb 12.8 oz (49.4 kg)   SpO2 99%   BMI 20.56 kg/m²      Constitutional: Well developed, nourished, no distress, alert, thin   HENT: Exterior ears and tympanic membranes normal bilaterally. Supple neck. No thyromegaly or lymphadenopathy. Eyes: Conjunctiva normal. PERRL. Eyelids normal.   CV: S1, S2.  RRR. No murmurs/rubs. No edema. Pulm: No abnormalities on inspection. Clear to auscultation bilaterally. No wheezing/rhonchi. Normal effort. Psych: Appropriate affect, judgement and insight. Short-term memory intact.

## 2020-10-13 NOTE — PROGRESS NOTES
Katarzyna Vines is a 78 y.o. female (: 1940) presenting to address:    Chief Complaint   Patient presents with    Dysphagia       Vitals:    10/13/20 0953   BP: 109/63   Pulse: 75   Resp: 17   Temp: 97.3 °F (36.3 °C)   TempSrc: Temporal   SpO2: 99%   Weight: 108 lb 12.8 oz (49.4 kg)   Height: 5' 1\" (1.549 m)   PainSc:   8   PainLoc: Vagina       Hearing/Vision:   No exam data present    Learning Assessment:   No flowsheet data found. Depression Screening:     3 most recent PHQ Screens 2020   PHQ Not Done Active Diagnosis of Depression or Bipolar Disorder   Little interest or pleasure in doing things Nearly every day   Feeling down, depressed, irritable, or hopeless Nearly every day   Total Score PHQ 2 6   Trouble falling or staying asleep, or sleeping too much Not at all   Feeling tired or having little energy Nearly every day   Poor appetite, weight loss, or overeating Nearly every day   Feeling bad about yourself - or that you are a failure or have let yourself or your family down Not at all   Trouble concentrating on things such as school, work, reading, or watching TV Not at all   Moving or speaking so slowly that other people could have noticed; or the opposite being so fidgety that others notice Not at all   Thoughts of being better off dead, or hurting yourself in some way Not at all   PHQ 9 Score 12   How difficult have these problems made it for you to do your work, take care of your home and get along with others Very difficult     Fall Risk Assessment:     Fall Risk Assessment, last 12 mths 2020   Able to walk? Yes   Fall in past 12 months? No   Fall with injury? -   Number of falls in past 12 months -   Fall Risk Score -     Abuse Screening:     Abuse Screening Questionnaire 2020   Do you ever feel afraid of your partner? N   Are you in a relationship with someone who physically or mentally threatens you? N   Is it safe for you to go home?  Y     Coordination of Care Questionaire:   1. Have you been to the ER, urgent care clinic since your last visit? Hospitalized since your last visit? No     2. Have you seen or consulted any other health care providers outside of the [address] [place] [address] since your last visit? Include any pap smears or colon screening. No   Advanced Directive:   1. Do you have an Advanced Directive? Yes at home   2. Would you like information on Advanced Directives? No       There are no preventive care reminders to display for this patient.

## 2020-10-15 LAB
ENA SS-A AB SER-ACNC: <0.2 AI (ref 0–0.9)
ENA SS-B AB SER-ACNC: <0.2 AI (ref 0–0.9)

## 2020-10-16 ENCOUNTER — PATIENT MESSAGE (OUTPATIENT)
Dept: FAMILY MEDICINE CLINIC | Age: 80
End: 2020-10-16

## 2020-10-16 DIAGNOSIS — F41.8 DEPRESSION WITH ANXIETY: ICD-10-CM

## 2020-10-21 DIAGNOSIS — F13.20 BENZODIAZEPINE DEPENDENCE (HCC): ICD-10-CM

## 2020-10-21 DIAGNOSIS — F41.8 DEPRESSION WITH ANXIETY: ICD-10-CM

## 2020-10-21 RX ORDER — BUSPIRONE HYDROCHLORIDE 15 MG/1
15 TABLET ORAL DAILY
Qty: 30 TAB | Refills: 0 | Status: SHIPPED | OUTPATIENT
Start: 2020-10-21 | End: 2020-11-02 | Stop reason: ALTCHOICE

## 2020-10-21 RX ORDER — LORAZEPAM 0.5 MG/1
0.25 TABLET ORAL
Qty: 30 TAB | Refills: 0 | Status: SHIPPED | OUTPATIENT
Start: 2020-10-21 | End: 2020-11-02 | Stop reason: SDUPTHER

## 2020-10-21 NOTE — TELEPHONE ENCOUNTER
Last seen: 10/13/2020    Last ordered: 9/22/2020 30tabs with 0 refills     Future Appointments   Date Time Provider Amari Rizo   12/7/2020 10:40 AM [facility]

## 2020-11-02 ENCOUNTER — VIRTUAL VISIT (OUTPATIENT)
Dept: FAMILY MEDICINE CLINIC | Age: 80
End: 2020-11-02
Payer: MEDICARE

## 2020-11-02 DIAGNOSIS — F13.20 BENZODIAZEPINE DEPENDENCE (HCC): ICD-10-CM

## 2020-11-02 DIAGNOSIS — F41.8 DEPRESSION WITH ANXIETY: ICD-10-CM

## 2020-11-02 PROCEDURE — 99442 PR PHYS/QHP TELEPHONE EVALUATION 11-20 MIN: CPT | Performed by: INTERNAL MEDICINE

## 2020-11-02 RX ORDER — VENLAFAXINE HYDROCHLORIDE 150 MG/1
150 CAPSULE, EXTENDED RELEASE ORAL DAILY
Qty: 90 CAP | Refills: 1 | Status: SHIPPED | OUTPATIENT
Start: 2020-11-02

## 2020-11-02 RX ORDER — LORAZEPAM 0.5 MG/1
0.5 TABLET ORAL
Qty: 90 TAB | Refills: 1 | Status: SHIPPED | OUTPATIENT
Start: 2020-11-02

## 2020-11-02 NOTE — PROGRESS NOTES
Katie Figueredo is a 78 y.o. female evaluated via telephone on 11/2/2020. Consent:  She and/or health care decision maker is aware that that she may receive a bill for this telephone service, depending on her insurance coverage, and has provided verbal consent to proceed: Yes      Documentation:  I communicated with the patient and/or health care decision maker about :  Pt c/o ongoing weight loss and loss of appetite. Pt had attributed it to thyroid meds (which we stopped). Her wt is 108lb. This is down 30lb from 2 years ago. Her labs show a subclinical hypothyroid picture off of meds x 2mos. But her appetite and wt hasn't improved. Had CT abd/pelvis recently which didn't show malignancy. mdd- has ongoing low mood. I had weaned her off ativan. She states she needs this medication. She is taking one whole tab daily and is requesting a script for such. Details of this discussion including any medical advice provided:   Diagnoses and all orders for this visit:    1. Depression with anxiety- incr dose effexor. Discussed risk of ongoing benzo use (increased falls and mortality). Cont ativan. -     LORazepam (ATIVAN) 0.5 mg tablet; Take 1 Tab by mouth daily as needed for Anxiety. -     venlafaxine-SR (EFFEXOR-XR) 150 mg capsule; Take 1 Cap by mouth daily. 2. Benzodiazepine dependence (HCC)  -     LORazepam (ATIVAN) 0.5 mg tablet; Take 1 Tab by mouth daily as needed for Anxiety. I affirm this is a Patient Initiated Episode with an Established Patient who has not had a related appointment within my department in the past 7 days or scheduled within the next 24 hours.     Total Time: minutes: 11-20 minutes    Note: not billable if this call serves to triage the patient into an appointment for the relevant concern  This service was provided through (Telehealth, Virtual Check In or E-Visit), both the patient at home and the provider at [facility]  and the DEJ[facility] Al[facility] at [email or web address] Curry MD

## 2020-11-13 ENCOUNTER — TELEPHONE (OUTPATIENT)
Dept: FAMILY MEDICINE CLINIC | Age: 80
End: 2020-11-13

## 2020-11-13 NOTE — TELEPHONE ENCOUNTER
Spoke with pharmacy. Pt was dispensed the 10/21 rx on 10/23/2020. Even if she was taking this twice per day she should have more days of it left. They have not given her the 11/2/2020. They said to fill this early they need to speak to the dr directly.

## 2020-11-13 NOTE — TELEPHONE ENCOUNTER
Pt called w/ an issue. She tried to  her lorazepam rx at the pharmacy and she couldn't because they are saying that she is too soon for a refill. They have her taking it once a day and the patient is stating that it should be twice a day. I did inform pt of Dr Morris Londono being out of the office and that this might not be something a covering provider could fix and that it might have to wait for Dr Morris Londono on Monday. Pt did voice understanding of this. Please advise.

## 2020-11-16 ENCOUNTER — PATIENT MESSAGE (OUTPATIENT)
Dept: FAMILY MEDICINE CLINIC | Age: 80
End: 2020-11-16

## 2020-12-28 NOTE — TELEPHONE ENCOUNTER
Pt called req medication refill. Requested Prescriptions     Pending Prescriptions Disp Refills    amLODIPine (NORVASC) 5 mg tablet       Sig: Take 1 Tab by mouth daily.

## 2020-12-29 RX ORDER — AMLODIPINE BESYLATE 5 MG/1
5 TABLET ORAL DAILY
Qty: 90 TAB | Refills: 1 | Status: SHIPPED | OUTPATIENT
Start: 2020-12-29

## 2022-03-18 PROBLEM — K22.4 ESOPHAGEAL DYSMOTILITY: Status: ACTIVE | Noted: 2020-07-27

## 2022-03-18 PROBLEM — F13.20 BENZODIAZEPINE DEPENDENCE (HCC): Status: ACTIVE | Noted: 2020-07-27

## 2022-03-18 PROBLEM — E03.8 HYPOTHYROIDISM DUE TO HASHIMOTO'S THYROIDITIS: Status: ACTIVE | Noted: 2020-07-27

## 2022-03-18 PROBLEM — K59.03 DRUG-INDUCED CONSTIPATION: Status: ACTIVE | Noted: 2020-09-22

## 2022-03-18 PROBLEM — E06.3 HYPOTHYROIDISM DUE TO HASHIMOTO'S THYROIDITIS: Status: ACTIVE | Noted: 2020-07-27

## 2022-03-19 PROBLEM — G20 PARKINSONISM (HCC): Status: ACTIVE | Noted: 2020-02-27

## 2022-03-19 PROBLEM — R63.4 UNINTENTIONAL WEIGHT LOSS: Status: ACTIVE | Noted: 2020-09-22

## 2022-03-19 PROBLEM — K21.9 ACID REFLUX: Status: ACTIVE | Noted: 2020-07-27

## 2022-03-20 PROBLEM — M81.0 OSTEOPOROSIS: Status: ACTIVE | Noted: 2020-07-27

## 2023-05-21 RX ORDER — LORAZEPAM 0.5 MG/1
0.5 TABLET ORAL DAILY PRN
COMMUNITY
Start: 2020-11-02

## 2023-05-21 RX ORDER — ESTRADIOL 0.1 MG/G
CREAM VAGINAL
COMMUNITY
Start: 2020-10-06

## 2023-05-21 RX ORDER — OMEPRAZOLE 40 MG/1
40 CAPSULE, DELAYED RELEASE ORAL DAILY
COMMUNITY
Start: 2020-08-28

## 2023-05-21 RX ORDER — AMLODIPINE BESYLATE 5 MG/1
5 TABLET ORAL DAILY
COMMUNITY
Start: 2020-12-29

## 2023-05-21 RX ORDER — POLYETHYLENE GLYCOL 3350 17 G/17G
17 POWDER, FOR SOLUTION ORAL DAILY
COMMUNITY
Start: 2020-09-22

## 2023-05-21 RX ORDER — LEVOTHYROXINE SODIUM 25 UG/1
25 CAPSULE ORAL
COMMUNITY
Start: 2020-07-27

## 2023-05-21 RX ORDER — LISINOPRIL 40 MG/1
40 TABLET ORAL DAILY
COMMUNITY
Start: 2020-05-11

## 2023-05-21 RX ORDER — LOVASTATIN 40 MG/1
40 TABLET ORAL
COMMUNITY
Start: 2020-07-27

## 2023-05-21 RX ORDER — VENLAFAXINE HYDROCHLORIDE 150 MG/1
150 CAPSULE, EXTENDED RELEASE ORAL DAILY
COMMUNITY
Start: 2020-11-02